# Patient Record
Sex: MALE | Race: WHITE | NOT HISPANIC OR LATINO | Employment: FULL TIME | URBAN - METROPOLITAN AREA
[De-identification: names, ages, dates, MRNs, and addresses within clinical notes are randomized per-mention and may not be internally consistent; named-entity substitution may affect disease eponyms.]

---

## 2017-03-28 ENCOUNTER — HOSPITAL ENCOUNTER (OUTPATIENT)
Dept: OTHER | Facility: HOSPITAL | Age: 22
Setting detail: SPECIMEN
Discharge: HOME | End: 2017-03-28
Attending: FAMILY MEDICINE | Admitting: FAMILY MEDICINE

## 2017-03-28 LAB
ALBUMIN SERPL-MCNC: 4.3 G/DL (ref 3.5–4.8)
ALBUMIN/GLOB SERPL: 1.5 {RATIO} (ref 1–1.7)
ALP SERPL-CCNC: 54 IU/L (ref 32–91)
ALT SERPL-CCNC: 20 IU/L (ref 17–63)
ANION GAP SERPL CALC-SCNC: 17.5 MMOL/L (ref 10–20)
AST SERPL-CCNC: 24 IU/L (ref 15–41)
BASOPHILS # BLD AUTO: 0.1 10*3/UL (ref 0–0.2)
BASOPHILS NFR BLD AUTO: 1 % (ref 0–2)
BILIRUB SERPL-MCNC: 1 MG/DL (ref 0.3–1.2)
BUN SERPL-MCNC: 15 MG/DL (ref 8–20)
BUN/CREAT SERPL: 15 (ref 6.2–20.3)
CALCIUM SERPL-MCNC: 9.7 MG/DL (ref 8.9–10.3)
CHLORIDE SERPL-SCNC: 101 MMOL/L (ref 101–111)
CHOLEST SERPL-MCNC: 157 MG/DL
CHOLEST/HDLC SERPL: 2.3 {RATIO}
CONV CO2: 25 MMOL/L (ref 22–32)
CONV LDL CHOLESTEROL DIRECT: 73 MG/DL (ref 0–100)
CONV TOTAL PROTEIN: 7.1 G/DL (ref 6.1–7.9)
CREAT UR-MCNC: 1 MG/DL (ref 0.7–1.2)
DIFFERENTIAL METHOD BLD: (no result)
EOSINOPHIL # BLD AUTO: 0.3 10*3/UL (ref 0–0.3)
EOSINOPHIL # BLD AUTO: 2 % (ref 0–3)
ERYTHROCYTE [DISTWIDTH] IN BLOOD BY AUTOMATED COUNT: 12.7 % (ref 11.5–14.5)
GLOBULIN UR ELPH-MCNC: 2.8 G/DL (ref 2.5–3.8)
GLUCOSE SERPL-MCNC: 73 MG/DL (ref 65–99)
HCT VFR BLD AUTO: 45.3 % (ref 40–54)
HDLC SERPL-MCNC: 68 MG/DL
HGB BLD-MCNC: 15.5 G/DL (ref 14–18)
LDLC/HDLC SERPL: 1.1 {RATIO}
LIPID INTERPRETATION: NORMAL
LYMPHOCYTES # BLD AUTO: 2.2 10*3/UL (ref 0.8–4.8)
LYMPHOCYTES NFR BLD AUTO: 17 % (ref 18–42)
MCH RBC QN AUTO: 29 PG (ref 26–32)
MCHC RBC AUTO-ENTMCNC: 34.3 G/DL (ref 32–36)
MCV RBC AUTO: 84.6 FL (ref 80–94)
MONOCYTES # BLD AUTO: 1 10*3/UL (ref 0.1–1.3)
MONOCYTES NFR BLD AUTO: 8 % (ref 2–11)
NEUTROPHILS # BLD AUTO: 9.9 10*3/UL (ref 2.3–8.6)
NEUTROPHILS NFR BLD AUTO: 72 % (ref 50–75)
NRBC BLD AUTO-RTO: 0 /100{WBCS}
NRBC/RBC NFR BLD MANUAL: 0 10*3/UL
PLATELET # BLD AUTO: 252 10*3/UL (ref 150–450)
PMV BLD AUTO: 10 FL (ref 7.4–10.4)
POTASSIUM SERPL-SCNC: 3.5 MMOL/L (ref 3.6–5.1)
RBC # BLD AUTO: 5.35 10*6/UL (ref 4.6–6)
SODIUM SERPL-SCNC: 140 MMOL/L (ref 136–144)
TRIGL SERPL-MCNC: 31 MG/DL
VLDLC SERPL CALC-MCNC: 15.9 MG/DL
WBC # BLD AUTO: 13.5 10*3/UL (ref 4.5–11.5)

## 2019-12-19 ENCOUNTER — TELEPHONE (OUTPATIENT)
Dept: FAMILY MEDICINE CLINIC | Facility: CLINIC | Age: 24
End: 2019-12-19

## 2020-02-19 RX ORDER — VENLAFAXINE HYDROCHLORIDE 75 MG/1
75 CAPSULE, EXTENDED RELEASE ORAL EVERY 24 HOURS
Qty: 30 CAPSULE | Refills: 0 | Status: CANCELLED | OUTPATIENT
Start: 2020-02-19

## 2020-02-19 RX ORDER — VENLAFAXINE HYDROCHLORIDE 75 MG/1
1 CAPSULE, EXTENDED RELEASE ORAL EVERY 24 HOURS
COMMUNITY
Start: 2018-03-23 | End: 2020-02-20 | Stop reason: SDUPTHER

## 2020-02-20 ENCOUNTER — TELEPHONE (OUTPATIENT)
Dept: FAMILY MEDICINE CLINIC | Facility: CLINIC | Age: 25
End: 2020-02-20

## 2020-02-20 RX ORDER — VENLAFAXINE HYDROCHLORIDE 75 MG/1
75 CAPSULE, EXTENDED RELEASE ORAL EVERY 24 HOURS
Qty: 30 CAPSULE | Refills: 0 | Status: SHIPPED | OUTPATIENT
Start: 2020-02-20 | End: 2020-02-27

## 2020-02-20 NOTE — TELEPHONE ENCOUNTER
Made an appointment but will need venlafaxine 75 milligram is out now but does have appointment uses [facility] here in [facility]

## 2020-02-27 ENCOUNTER — OFFICE VISIT (OUTPATIENT)
Dept: FAMILY MEDICINE CLINIC | Facility: CLINIC | Age: 25
End: 2020-02-27

## 2020-02-27 VITALS
DIASTOLIC BLOOD PRESSURE: 76 MMHG | HEART RATE: 78 BPM | HEIGHT: 67 IN | OXYGEN SATURATION: 98 % | BODY MASS INDEX: 28.41 KG/M2 | SYSTOLIC BLOOD PRESSURE: 134 MMHG | WEIGHT: 181 LBS

## 2020-02-27 DIAGNOSIS — R03.0 ELEVATED BLOOD PRESSURE READING IN OFFICE WITHOUT DIAGNOSIS OF HYPERTENSION: Primary | ICD-10-CM

## 2020-02-27 DIAGNOSIS — F41.9 ANXIETY: ICD-10-CM

## 2020-02-27 PROCEDURE — 99214 OFFICE O/P EST MOD 30 MIN: CPT | Performed by: NURSE PRACTITIONER

## 2020-02-27 RX ORDER — VENLAFAXINE HYDROCHLORIDE 37.5 MG/1
37.5 CAPSULE, EXTENDED RELEASE ORAL DAILY
Qty: 30 CAPSULE | Refills: 1 | Status: SHIPPED | OUTPATIENT
Start: 2020-02-27 | End: 2020-04-17

## 2020-02-27 NOTE — PATIENT INSTRUCTIONS
Start 37.5mg XR tonight, if stable w/o side effects continue for 4-6 weeks. If develops side efffects then alternate 75mg xr and 37.5mg xr for 1 week.   Once stable on 37.5 mg, in 4-6 weeks call and will then wean to 25mg non-XR. You will do this for 2-4 weeks, then will cut in half to 12.5mg for 2-4 weeks then every other day, then every 2-3 days until able to wean off.

## 2020-02-27 NOTE — PROGRESS NOTES
"  Renan Britton is a 24 y.o. male.     Chief Complaint   Patient presents with   • Establish Care     discuss med change/ getting off medication.        History of Present Illness     Anxiety, patient denies significant weight loss/gain, insomnia, hypersomnia, psychomotor agitation, psychomotor retardation, fatigue (loss of energy), feelings of worthlessness (guilt), impaired concentration (indecisiveness), thoughts of death or suicide.  Feels stable on Effexor and would like to attempt weaning off of the medication, he is concerned because he has accidentally missed a dose a couple times and had significant nausea, dizziness side effects and does not want to feel that dependent on medication.       HTN, initial BP was greater than 150 systolic, he does not take medications, denies chest pain, headache, shortness of air, palpitations and swelling of extremities. Repeat wnl.  Does have strong Fam hx of HTN      Subjective     Visit Vitals  /76 (BP Location: Left arm, Patient Position: Sitting, Cuff Size: Large Adult)   Pulse 78   Ht 170.2 cm (67\")   Wt 82.1 kg (181 lb)   SpO2 98%   BMI 28.35 kg/m²       The following portions of the patient's history were reviewed and updated as appropriate: allergies, current medications, past family history, past medical history, past social history, past surgical history and problem list.    Review of Systems   Constitutional: Negative for chills, fatigue and fever.   HENT: Negative for dental problem, ear pain, sinus pressure and sore throat.    Eyes: Negative for visual disturbance.   Respiratory: Negative for cough, shortness of breath and wheezing.    Gastrointestinal: Negative for abdominal pain, blood in stool, constipation, diarrhea, nausea, vomiting and GERD.   Genitourinary: Negative for difficulty urinating, frequency, urgency and urinary incontinence.   Musculoskeletal: Negative for arthralgias, back pain, gait problem, joint swelling, myalgias and neck pain. "   Skin: Negative for dry skin, pallor and rash.   Neurological: Negative for dizziness, seizures, speech difficulty and weakness.   Hematological: Negative for adenopathy.   Psychiatric/Behavioral: Negative for sleep disturbance, depressed mood and stress. The patient is not nervous/anxious.        Objective     Physical Exam   Constitutional: He is oriented to person, place, and time. He appears well-developed and well-nourished. No distress.   HENT:   Head: Normocephalic.   Eyes: Pupils are equal, round, and reactive to light. Conjunctivae are normal.   Neck: Normal range of motion. Neck supple. No JVD present. No thyromegaly present.   Cardiovascular: Normal rate, regular rhythm and normal heart sounds.   No murmur heard.  Pulmonary/Chest: Effort normal and breath sounds normal.   Abdominal: Soft. Bowel sounds are normal. He exhibits no distension. There is no tenderness.   Musculoskeletal: Normal range of motion. He exhibits no edema or tenderness.   Neurological: He is alert and oriented to person, place, and time. No sensory deficit.   Skin: Skin is warm and dry. No rash noted. He is not diaphoretic. No erythema.   Psychiatric: He has a normal mood and affect. His behavior is normal. Judgment normal.   Nursing note and vitals reviewed.        Assessment/Plan   Renan was seen today for establish care.    Diagnoses and all orders for this visit:    Elevated blood pressure reading in office without diagnosis of hypertension  BP within normal limits after recheck, instructed patient to check outside of the office and keep a blood pressure log.  Call for SBP greater than 150 consistently    Anxiety  -     venlafaxine XR (EFFEXOR XR) 37.5 MG 24 hr capsule; Take 1 capsule by mouth Daily.  Patient's anxiety is stable, will start Effexor 37.5mg XR tonight, if stable w/o side effects continue for 4-6 weeks. If develops side efffects then alternate 75mg xr and 37.5mg xr for 1 week.   Once stable on 37.5 mg XR, pt to call  office in 4-6 weeks and will then wean to 25mg non-XR, for 2-4 weeks, then will cut in half to 12.5mg for 2-4 weeks then every other day, then every 2-3 days until able to wean off.     Return to office in 6 months with hypertension panel 1 week prior to the appointment, we will discuss at the visit and re-eval all current conditions           Glucose   Date Value Ref Range Status   03/28/2017 73 65 - 99 mg/dL Final     BUN   Date Value Ref Range Status   03/28/2017 15 8 - 20 mg/dL Final     Creatinine   Date Value Ref Range Status   03/28/2017 1.0 0.7 - 1.2 mg/dl Final     Sodium   Date Value Ref Range Status   03/28/2017 140 136 - 144 mmol/L Final     Potassium   Date Value Ref Range Status   03/28/2017 3.5 (L) 3.6 - 5.1 mmol/L Final     Chloride   Date Value Ref Range Status   03/28/2017 101 101 - 111 mmol/L Final     CO2   Date Value Ref Range Status   03/28/2017 25 22 - 32 mmol/L Final     Calcium   Date Value Ref Range Status   03/28/2017 9.7 8.9 - 10.3 mg/dL Final     Total Protein   Date Value Ref Range Status   03/28/2017 7.1 6.1 - 7.9 g/dL Final     Albumin   Date Value Ref Range Status   03/28/2017 4.3 3.5 - 4.8 g/dL Final     ALT (SGPT)   Date Value Ref Range Status   03/28/2017 20 17 - 63 IU/L Final     AST (SGOT)   Date Value Ref Range Status   03/28/2017 24 15 - 41 IU/L Final     Alkaline Phosphatase   Date Value Ref Range Status   03/28/2017 54 32 - 91 IU/L Final     Total Bilirubin   Date Value Ref Range Status   03/28/2017 1.0 0.3 - 1.2 mg/dL Final     A/G Ratio   Date Value Ref Range Status   03/28/2017 1.5 1.0 - 1.7 Final     BUN/Creatinine Ratio   Date Value Ref Range Status   03/28/2017 15.0 6.2 - 20.3 Final     Anion Gap   Date Value Ref Range Status   03/28/2017 17.5 10 - 20 Final

## 2020-03-13 RX ORDER — VENLAFAXINE HYDROCHLORIDE 75 MG/1
CAPSULE, EXTENDED RELEASE ORAL
Qty: 30 CAPSULE | Refills: 0 | Status: SHIPPED | OUTPATIENT
Start: 2020-03-13 | End: 2020-04-09

## 2020-04-09 RX ORDER — VENLAFAXINE HYDROCHLORIDE 75 MG/1
CAPSULE, EXTENDED RELEASE ORAL
Qty: 30 CAPSULE | Refills: 2 | Status: SHIPPED | OUTPATIENT
Start: 2020-04-09 | End: 2020-06-25 | Stop reason: DRUGHIGH

## 2020-04-17 DIAGNOSIS — F41.9 ANXIETY: ICD-10-CM

## 2020-04-17 RX ORDER — VENLAFAXINE HYDROCHLORIDE 37.5 MG/1
CAPSULE, EXTENDED RELEASE ORAL
Qty: 30 CAPSULE | Refills: 1 | Status: SHIPPED | OUTPATIENT
Start: 2020-04-17 | End: 2020-06-19

## 2020-06-19 ENCOUNTER — TELEPHONE (OUTPATIENT)
Dept: FAMILY MEDICINE CLINIC | Facility: CLINIC | Age: 25
End: 2020-06-19

## 2020-06-19 DIAGNOSIS — F41.9 ANXIETY: ICD-10-CM

## 2020-06-19 RX ORDER — VENLAFAXINE HYDROCHLORIDE 37.5 MG/1
CAPSULE, EXTENDED RELEASE ORAL
Qty: 30 CAPSULE | Refills: 1 | Status: SHIPPED | OUTPATIENT
Start: 2020-06-19 | End: 2020-06-25

## 2020-06-25 ENCOUNTER — TELEPHONE (OUTPATIENT)
Dept: FAMILY MEDICINE CLINIC | Facility: CLINIC | Age: 25
End: 2020-06-25

## 2020-06-25 RX ORDER — VENLAFAXINE 25 MG/1
TABLET ORAL
Qty: 30 TABLET | Refills: 2 | Status: SHIPPED | OUTPATIENT
Start: 2020-06-25 | End: 2020-08-12 | Stop reason: SDUPTHER

## 2020-06-25 NOTE — TELEPHONE ENCOUNTER
Discussed with patient.  He said he is no longer taking 75 mg and wanted to lower his dose again.  Message sent to alessandro with details.

## 2020-06-25 NOTE — TELEPHONE ENCOUNTER
I sent 25mg to pharmacy with weaning instructions, take the 25mg for 2-4 weeks, then cut in half to 12.5mg for 2-4 weeks, then every other day, then every 3 days until able to wean off.

## 2020-08-10 ENCOUNTER — TELEPHONE (OUTPATIENT)
Dept: FAMILY MEDICINE CLINIC | Facility: CLINIC | Age: 25
End: 2020-08-10

## 2020-08-10 NOTE — TELEPHONE ENCOUNTER
Patient called to reports symptoms.  Said last night, he was having trouble sleeping because he felt cold and couldn't really get warm.  He said it wasn't really chills.  He said he had some nausea, but he said he doesn't feel that way now.  He said he never ran a fever.  He asked if he should have an appointment or just go be tested COVID?

## 2020-08-11 NOTE — TELEPHONE ENCOUNTER
If he has not had a close exposure to someone positive with COVID-19 and symptoms are resolved he likely does not need testing.  Testing is running 5 to 7 days right now for results unless he can find a rapid center, but not necessary if he is feeling back to normal now.

## 2020-08-11 NOTE — TELEPHONE ENCOUNTER
He continue cutting back as feels comfortable, I have instructions on the last rx, please resend if he needs. thanks

## 2020-08-11 NOTE — TELEPHONE ENCOUNTER
Patient notified and indicated understanding.  Patient also asked if he could bump down to any even lower dose of venlafaxine?  If so, could you send to pharmacy?  Thanks.

## 2020-08-11 NOTE — TELEPHONE ENCOUNTER
He should Self quarantine only 10 days from a positive test and symptoms. If he goes to get tested then must quarantine at least until results come back.

## 2020-08-12 RX ORDER — VENLAFAXINE 25 MG/1
TABLET ORAL
Qty: 30 TABLET | Refills: 2 | Status: SHIPPED | OUTPATIENT
Start: 2020-08-12 | End: 2020-08-18 | Stop reason: SDUPTHER

## 2020-08-12 NOTE — TELEPHONE ENCOUNTER
Patient reports that he wanted the medication sent to [facility] and that he never received the 25 mg dosing.  I resent to [facility].

## 2020-08-18 DIAGNOSIS — F41.9 ANXIETY: ICD-10-CM

## 2020-08-18 RX ORDER — VENLAFAXINE 25 MG/1
TABLET ORAL
Qty: 30 TABLET | Refills: 2 | Status: SHIPPED | OUTPATIENT
Start: 2020-08-18

## 2020-08-18 RX ORDER — VENLAFAXINE HYDROCHLORIDE 37.5 MG/1
CAPSULE, EXTENDED RELEASE ORAL
Qty: 30 CAPSULE | Refills: 1 | OUTPATIENT
Start: 2020-08-18

## 2020-08-20 PROCEDURE — 80053 COMPREHEN METABOLIC PANEL: CPT | Performed by: NURSE PRACTITIONER

## 2020-08-20 PROCEDURE — 80061 LIPID PANEL: CPT | Performed by: NURSE PRACTITIONER

## 2020-08-20 PROCEDURE — 84443 ASSAY THYROID STIM HORMONE: CPT | Performed by: NURSE PRACTITIONER

## 2020-08-20 PROCEDURE — 85027 COMPLETE CBC AUTOMATED: CPT | Performed by: NURSE PRACTITIONER

## 2020-08-27 ENCOUNTER — OFFICE VISIT (OUTPATIENT)
Dept: FAMILY MEDICINE CLINIC | Facility: CLINIC | Age: 25
End: 2020-08-27

## 2020-08-27 VITALS
WEIGHT: 172.8 LBS | BODY MASS INDEX: 27.12 KG/M2 | HEIGHT: 67 IN | HEART RATE: 69 BPM | TEMPERATURE: 98 F | DIASTOLIC BLOOD PRESSURE: 74 MMHG | SYSTOLIC BLOOD PRESSURE: 118 MMHG | OXYGEN SATURATION: 97 %

## 2020-08-27 DIAGNOSIS — F41.9 ANXIETY: ICD-10-CM

## 2020-08-27 DIAGNOSIS — Z00.00 PREVENTATIVE HEALTH CARE: Primary | ICD-10-CM

## 2020-08-27 PROCEDURE — 99213 OFFICE O/P EST LOW 20 MIN: CPT | Performed by: NURSE PRACTITIONER

## 2020-08-27 NOTE — PROGRESS NOTES
Chief Complaint   Patient presents with   • Depression   • Follow-up   • Results     labs from last week       HPI    Pt presents for 6mo to follow-up on anxiety/depression and would like to review labs. Does not need medication refill. The patient denies dysuria, constipation, GERD, chest pain, shortness of air, palpitations and swelling of the extremities    Anxiety/depression, patient is doing well weaning Effexor, currently on 25 mg daily.  Patient denies significant weight loss/gain, insomnia, hypersomnia, psychomotor agitation, psychomotor retardation, fatigue (loss of energy), feelings of worthlessness (guilt), impaired concentration (indecisiveness), thoughts of death or suicide.           The following portions of the patient's history were reviewed and updated as appropriate: allergies, current medications, past family history, past medical history, past social history, past surgical history and problem list.    Past Medical History:   Diagnosis Date   • Abnormal heart rhythm    • Acute bronchitis    • Allergic asthma    • Anxiety with depression    • Chest pain    • Hamstring tendinitis    • Influenza A    • Mixed obsessional thoughts and acts    • Mycoplasma infection    • Persistent cough    • Seasonal allergies    • Sinusitis    • Upper respiratory infection    • Wart      History reviewed. No pertinent surgical history.  Family History   Problem Relation Age of Onset   • Diabetes Other    • Allergy (severe) Other    • Cancer Other      Social History     Tobacco Use   • Smoking status: Never Smoker   • Smokeless tobacco: Never Used   Substance Use Topics   • Alcohol use: Yes     Comment: social         Current Outpatient Medications:   •  venlafaxine (EFFEXOR) 25 MG tablet, Take 1 po daily for 2-4 weeks, then cut in half to 12.5mg for 2-4 weeks, then every other day for 2 weeks, then every 3 days until able to wean off., Disp: 30 tablet, Rfl: 2      Review of Systems       Obtained as mentioned in  "HPI, otherwise negative.       Vitals:    08/27/20 0819   BP: 118/74   BP Location: Left arm   Patient Position: Sitting   Cuff Size: Adult   Pulse: 69   Temp: 98 °F (36.7 °C)   TempSrc: Skin   SpO2: 97%   Weight: 78.4 kg (172 lb 12.8 oz)   Height: 170.2 cm (67.01\")     Body mass index is 27.06 kg/m².    Physical Exam   Constitutional: He is oriented to person, place, and time. He appears well-developed and well-nourished. No distress.   HENT:   Head: Normocephalic and atraumatic.   Eyes: Pupils are equal, round, and reactive to light.   Neck: Normal range of motion. No thyromegaly present.   Cardiovascular: Normal rate, regular rhythm, normal heart sounds and intact distal pulses.   No murmur heard.  Pulmonary/Chest: Effort normal and breath sounds normal. No respiratory distress.   Abdominal: Soft. Bowel sounds are normal. He exhibits no distension. There is no tenderness.   Musculoskeletal: Normal range of motion.   Neurological: He is alert and oriented to person, place, and time.   Skin: Skin is warm and dry. He is not diaphoretic. No erythema.   Psychiatric: He has a normal mood and affect. His behavior is normal. Judgment and thought content normal.   Nursing note and vitals reviewed.      No visits with results within 7 Day(s) from this visit.   Latest known visit with results is:   Appointment on 08/20/2020   Component Date Value Ref Range Status   • Total Cholesterol 08/20/2020 189  0 - 200 mg/dL Final   • Triglycerides 08/20/2020 58  0 - 150 mg/dL Final   • HDL Cholesterol 08/20/2020 64* 40 - 60 mg/dL Final   • LDL Cholesterol  08/20/2020 113* 0 - 100 mg/dL Final   • VLDL Cholesterol 08/20/2020 11.6  5 - 40 mg/dL Final   • LDL/HDL Ratio 08/20/2020 1.77   Final   • Glucose 08/20/2020 105* 65 - 99 mg/dL Final   • BUN 08/20/2020 14  6 - 20 mg/dL Final   • Creatinine 08/20/2020 0.95  0.76 - 1.27 mg/dL Final   • Sodium 08/20/2020 139  136 - 145 mmol/L Final   • Potassium 08/20/2020 4.4  3.5 - 5.2 mmol/L Final "   • Chloride 08/20/2020 101  98 - 107 mmol/L Final   • CO2 08/20/2020 28.8  22.0 - 29.0 mmol/L Final   • Calcium 08/20/2020 9.9  8.6 - 10.5 mg/dL Final   • Total Protein 08/20/2020 7.3  6.0 - 8.5 g/dL Final   • Albumin 08/20/2020 4.80  3.50 - 5.20 g/dL Final   • ALT (SGPT) 08/20/2020 20  1 - 41 U/L Final   • AST (SGOT) 08/20/2020 20  1 - 40 U/L Final   • Alkaline Phosphatase 08/20/2020 51  39 - 117 U/L Final   • Total Bilirubin 08/20/2020 0.5  0.0 - 1.2 mg/dL Final   • eGFR Non African Amer 08/20/2020 98  >60 mL/min/1.73 Final   • Globulin 08/20/2020 2.5  gm/dL Final   • A/G Ratio 08/20/2020 1.9  g/dL Final   • BUN/Creatinine Ratio 08/20/2020 14.7  7.0 - 25.0 Final   • Anion Gap 08/20/2020 9.2  5.0 - 15.0 mmol/L Final   • WBC 08/20/2020 5.54  3.40 - 10.80 10*3/mm3 Final   • RBC 08/20/2020 5.20  4.14 - 5.80 10*6/mm3 Final   • Hemoglobin 08/20/2020 15.7  13.0 - 17.7 g/dL Final   • Hematocrit 08/20/2020 45.4  37.5 - 51.0 % Final   • MCV 08/20/2020 87.3  79.0 - 97.0 fL Final   • MCH 08/20/2020 30.2  26.6 - 33.0 pg Final   • MCHC 08/20/2020 34.6  31.5 - 35.7 g/dL Final   • RDW 08/20/2020 12.2* 12.3 - 15.4 % Final   • RDW-SD 08/20/2020 39.5  37.0 - 54.0 fl Final   • MPV 08/20/2020 11.7  6.0 - 12.0 fL Final   • Platelets 08/20/2020 235  140 - 450 10*3/mm3 Final   • TSH 08/20/2020 1.100  0.270 - 4.200 uIU/mL Final       Diagnoses and all orders for this visit:    1. Preventative health care (Primary)  Comments:  stable, reviewed labs with slight elev of LDL. He is overall healthy, eats well, we discussed HHD, limit fatty, fried foods and improving exercise habits     2. Anxiety  Comments:  stable, weaning off effexor, now on 25mg and will be decreasing to 12.5mg soon.  Return in 6 months if unable to wean off of Effexor.      Return to clinic in 1 year if off of Effexor and stable, or earlier if needed. HTN panel 1 wk prior to f/u.

## 2021-05-11 ENCOUNTER — LAB (OUTPATIENT)
Dept: LAB | Facility: HOSPITAL | Age: 26
End: 2021-05-11

## 2021-05-11 ENCOUNTER — TRANSCRIBE ORDERS (OUTPATIENT)
Dept: LAB | Facility: HOSPITAL | Age: 26
End: 2021-05-11

## 2021-05-11 DIAGNOSIS — J30.89 NON-SEASONAL ALLERGIC RHINITIS, UNSPECIFIED TRIGGER: ICD-10-CM

## 2021-05-11 DIAGNOSIS — G72.49 IMMUNE MYOPATHY: ICD-10-CM

## 2021-05-11 DIAGNOSIS — R07.89 OTHER CHEST PAIN: ICD-10-CM

## 2021-05-11 DIAGNOSIS — R07.89 OTHER CHEST PAIN: Primary | ICD-10-CM

## 2021-05-11 PROCEDURE — 85379 FIBRIN DEGRADATION QUANT: CPT

## 2021-05-11 PROCEDURE — 85025 COMPLETE CBC W/AUTO DIFF WBC: CPT

## 2021-05-11 PROCEDURE — 36415 COLL VENOUS BLD VENIPUNCTURE: CPT
